# Patient Record
Sex: MALE | Race: WHITE | Employment: FULL TIME | URBAN - METROPOLITAN AREA
[De-identification: names, ages, dates, MRNs, and addresses within clinical notes are randomized per-mention and may not be internally consistent; named-entity substitution may affect disease eponyms.]

---

## 2021-08-24 ENCOUNTER — HOSPITAL ENCOUNTER (EMERGENCY)
Age: 51
Discharge: HOME | End: 2021-08-24
Attending: STUDENT IN AN ORGANIZED HEALTH CARE EDUCATION/TRAINING PROGRAM
Payer: COMMERCIAL

## 2021-08-24 VITALS
HEART RATE: 68 BPM | RESPIRATION RATE: 20 BRPM | TEMPERATURE: 98.8 F | SYSTOLIC BLOOD PRESSURE: 127 MMHG | OXYGEN SATURATION: 98 % | DIASTOLIC BLOOD PRESSURE: 86 MMHG

## 2021-08-24 DIAGNOSIS — F43.0 ACUTE STRESS REACTION: Primary | ICD-10-CM

## 2021-08-24 PROCEDURE — 99284 EMERGENCY DEPT VISIT MOD MDM: CPT

## 2021-08-24 PROCEDURE — 74011250637 HC RX REV CODE- 250/637: Performed by: STUDENT IN AN ORGANIZED HEALTH CARE EDUCATION/TRAINING PROGRAM

## 2021-08-24 RX ORDER — LORAZEPAM 1 MG/1
1 TABLET ORAL
Status: COMPLETED | OUTPATIENT
Start: 2021-08-24 | End: 2021-08-24

## 2021-08-24 RX ORDER — HYDROXYZINE PAMOATE 25 MG/1
25 CAPSULE ORAL
Qty: 20 CAPSULE | Refills: 0 | Status: SHIPPED | OUTPATIENT
Start: 2021-08-24

## 2021-08-24 RX ADMIN — LORAZEPAM 1 MG: 1 TABLET ORAL at 11:49

## 2021-08-24 NOTE — ED PROVIDER NOTES
47-year-old male patient presents with reports of panic attack. Patient states he was at his father's  yesterday when symptoms began. He describes feelings of anxiousness, shaking, sadness and paranoia. Patient states he is scared to drive and is in a constant state of dread. He denies suicidal ideations or homicidal ideations and reports no auditory or visual hallucinations associated with symptoms. Patient reports similar symptoms in the past at various times in his life though never quite so significant as yesterday. Patient states his father has been ill for some time and recently passed. He also reports an argument with his daughter during this  yesterday which contributed to his symptoms. Patient does not see a mental health provider but has in the past and was on antidepressants for some time. He no longer takes medication at this time. Patient denies any other symptoms currently. Becomes tearful at times during his evaluation. No past medical history on file. No past surgical history on file. No family history on file. Social History     Socioeconomic History    Marital status: SINGLE     Spouse name: Not on file    Number of children: Not on file    Years of education: Not on file    Highest education level: Not on file   Occupational History    Not on file   Tobacco Use    Smoking status: Not on file   Substance and Sexual Activity    Alcohol use: Not on file    Drug use: Not on file    Sexual activity: Not on file   Other Topics Concern    Not on file   Social History Narrative    Not on file     Social Determinants of Health     Financial Resource Strain:     Difficulty of Paying Living Expenses:    Food Insecurity:     Worried About Running Out of Food in the Last Year:     [address] in the Last Year:    Transportation Needs:     Lack of Transportation (Medical):      Lack of Transportation (Non-Medical):    Physical Activity:     Days of Exercise per Week:     Minutes of Exercise per Session:    Stress:     Feeling of Stress :    Social Connections:     Frequency of Communication with Friends and Family:     Frequency of Social Gatherings with Friends and Family:     Attends [employer] Services:     Active Member of Clubs or Organizations:     Attends Club or Organization Meetings:     Marital Status:    Intimate Partner Violence:     Fear of Current or Ex-Partner:     Emotionally Abused:     Physically Abused:     Sexually Abused: ALLERGIES: Patient has no known allergies. Review of Systems   Psychiatric/Behavioral: Positive for sleep disturbance. The patient is nervous/anxious. All other systems reviewed and are negative. Vitals:    08/24/21 1010   BP: (!) 155/96   Pulse: 66   Resp: 22   Temp: 98.8 °F (37.1 °C)   SpO2: 97%            Physical Exam  Vitals and nursing note reviewed. Constitutional:       General: He is not in acute distress. Appearance: He is well-developed. He is not diaphoretic. Comments: Generally well-appearing male patient, resting comfortably prior to evaluation alert and oriented to person place and time. No acute distress, speaks in clear, fluid sentences. HENT:      Head: Normocephalic and atraumatic. Right Ear: External ear normal.      Left Ear: External ear normal.      Nose: Nose normal.   Eyes:      Pupils: Pupils are equal, round, and reactive to light. Cardiovascular:      Rate and Rhythm: Normal rate and regular rhythm. Heart sounds: Normal heart sounds. No murmur heard. No friction rub. No gallop. Pulmonary:      Effort: Pulmonary effort is normal. No respiratory distress. Breath sounds: Normal breath sounds. No stridor. No decreased breath sounds, wheezing, rhonchi or rales. Chest:      Chest wall: No tenderness. Abdominal:      General: There is no distension. Palpations: Abdomen is soft. There is no mass. Tenderness:  There is no abdominal tenderness. There is no guarding or rebound. Hernia: No hernia is present. Musculoskeletal:         General: No tenderness or deformity. Normal range of motion. Cervical back: Normal range of motion. Skin:     General: Skin is warm and dry. Neurological:      Mental Status: He is alert and oriented to person, place, and time. Cranial Nerves: No cranial nerve deficit. Psychiatric:         Attention and Perception: Attention and perception normal.         Mood and Affect: Affect is labile and tearful. Speech: Speech normal.         Behavior: Behavior normal.         Thought Content: Thought content is paranoid. Comments: Patient reports generalized feelings of \"paranoia\". He states he is fearful in general but cannot elaborate on the symptoms. He notes fear when driving while at home. He denies feelings of self-harm. Patient becomes tearful when discussing his father and the recent .          MDM  Number of Diagnoses or Management Options  Acute stress reaction: new and requires workup  Diagnosis management comments: Patient states he still feels anxious and would like something for his nerves in this department. He is open to outpatient follow-up the psychiatrist or other mental health provider. I feel this is most appropriate. He denies illicit substances though does endorse a history of alcohol use. 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 Will speak with case management concerning outpatient referral and follow-up for depression and anxiety. No indication for labs at this time. Patient was seen and evaluated by case management he is provided outpatient resources to both primary care and mental health with follow-up. Will provide a short course of Vistaril to use as needed for anxiety and panic. Patient encouraged to come to this department if symptoms worsen. Again he denies any suicidal ideations or homicidal ideations.     Voice dictation software was used during the making of this note. This software is not perfect and grammatical and other typographical errors may be present. This note has been proofread, but may still contain errors.   Misha Nguyễn DO; 8/24/2021 @11:49 AM   ===================================================================         Amount and/or Complexity of Data Reviewed  Tests in the medicine section of CPT®: ordered and reviewed  Review and summarize past medical records: yes  Discuss the patient with other providers: yes    Risk of Complications, Morbidity, and/or Mortality  Presenting problems: moderate  Diagnostic procedures: low  Management options: moderate    Patient Progress  Patient progress: stable         Procedures

## 2021-08-24 NOTE — ED NOTES
Patient reports he was at his father's  yesterday and had meltdown and panic attack and has been tearful, feels out of control ever since. Father passed away Friday. Has been anxious past few weeks but worse since his father . Patient is tearful on exam, covering his eyes. Speaking in full sentences. No SI or HI. Patient evaluated initially in triage. Rapid Medical Evaluation was conducted and necessary orders have been placed. I have performed a medical screening exam.  Care will now be transferred to the attending physician in the emergency department.   Oscar Mayer 59:06 AM

## 2021-08-24 NOTE — DISCHARGE INSTRUCTIONS
Use the medication prescribed as needed for your symptoms. Arrange follow-up with one of the providers recommended by case management. Return for worsening symptoms, concerns or questions.

## 2021-08-24 NOTE — ED TRIAGE NOTES
Pt states he was at fathers  yesterday, states he had a possible panic attack. States his father passed away Friday. For past few weeks has been having increased paranoia, anxiety. Denies thoughts of harming himself or anyone else. Mask applied to pt.

## 2021-08-24 NOTE — ED NOTES
I have reviewed discharge instructions with the patient. The patient verbalized understanding. Patient left ED via Discharge Method: ambulatory to Home with (family/friend). Opportunity for questions and clarification provided. Patient given 1 scripts. No esign         To continue your aftercare when you leave the hospital, you may receive an automated call from our care team to check in on how you are doing. This is a free service and part of our promise to provide the best care and service to meet your aftercare needs.  If you have questions, or wish to unsubscribe from this service please call [phone]. Thank you for Choosing our [facility].